# Patient Record
Sex: FEMALE
[De-identification: names, ages, dates, MRNs, and addresses within clinical notes are randomized per-mention and may not be internally consistent; named-entity substitution may affect disease eponyms.]

---

## 2018-05-16 ENCOUNTER — HOSPITAL ENCOUNTER (EMERGENCY)
Dept: HOSPITAL 68 - ERH | Age: 83
End: 2018-05-16
Payer: COMMERCIAL

## 2018-05-16 VITALS — BODY MASS INDEX: 27.48 KG/M2 | HEIGHT: 60 IN | WEIGHT: 140 LBS

## 2018-05-16 VITALS — SYSTOLIC BLOOD PRESSURE: 106 MMHG | DIASTOLIC BLOOD PRESSURE: 55 MMHG

## 2018-05-16 DIAGNOSIS — D69.6: Primary | ICD-10-CM

## 2018-05-16 DIAGNOSIS — F03.90: ICD-10-CM

## 2018-05-16 LAB
ABSOLUTE GRANULOCYTE CT: 3.9 /CUMM (ref 1.4–6.5)
APTT BLD: 30 SEC (ref 25–37)
BASOPHILS # BLD: 0 /CUMM (ref 0–0.2)
BASOPHILS NFR BLD: 0.6 % (ref 0–2)
EOSINOPHIL # BLD: 0.2 /CUMM (ref 0–0.7)
EOSINOPHIL NFR BLD: 4.1 % (ref 0–5)
ERYTHROCYTE [DISTWIDTH] IN BLOOD BY AUTOMATED COUNT: 16 % (ref 11.5–14.5)
GRANULOCYTES NFR BLD: 67.9 % (ref 42.2–75.2)
HCT VFR BLD CALC: 24.1 % (ref 37–47)
LYMPHOCYTES # BLD: 1 /CUMM (ref 1.2–3.4)
MCH RBC QN AUTO: 36.8 PG (ref 27–31)
MCHC RBC AUTO-ENTMCNC: 33.6 G/DL (ref 33–37)
MCV RBC AUTO: 109.4 FL (ref 81–99)
MONOCYTES # BLD: 0.5 /CUMM (ref 0.1–0.6)
PLATELET # BLD: 5 /CUMM (ref 130–400)
PMV BLD AUTO: 6.8 FL (ref 7.4–10.4)
PROTHROMBIN TIME: 12.7 SEC (ref 9.4–12.5)
RED BLOOD CELL CT: 2.2 /CUMM (ref 4.2–5.4)
WBC # BLD AUTO: 5.7 /CUMM (ref 4.8–10.8)

## 2018-05-16 NOTE — ED GENERAL ADULT
History of Present Illness
 
General
Chief Complaint: General Adult
Stated Complaint: BIBA ABNORMAL LABS
Source: old records
Exam Limitations: dementia
 
Vital Signs & Intake/Output
Vital Signs & Intake/Output
 Vital Signs
 
 
Date Time Temp Pulse Resp B/P B/P Pulse O2 O2 Flow FiO2
 
     Mean Ox Delivery Rate 
 
05/16 1846 96.2 66 18 106/55  98 Room Air  
 
05/16 1615 96.1 88 18 107/47  98 Room Air  
 
 
 
Triage Note:
BIBA FROM [facility] FOR LOW PLATELET COUNT ON
 RECENT BLOOD WORK. HERE FOR EVALUATION. AT
 BASELINE PT WITH DEMENTIA, NON VERBAL, ARMS STIFF
 AND CONTRACTED. PT EYES OPEN, IN NO APPARANT
 DISTRESS, MAKING UNRECOGNIZABLE NOISES, NO
 UNDERSTANDABLE WORDS. SKIN PALE WITH MULTIPLE
 ECCHYMOTIC AREAS
Triage Nurses Notes Reviewed? yes
Onset: Abrupt
Duration: constant
Timing: recent history
Severity: severe
Severity Numbers: 10
HPI:
Patient is a 86-year-old female with past medical history of dementia who 
presents emergency room brought in by ambulance for concerns of critical 
findings of a 7000 platelet count noted by patient's nursing staff at [facility].
 W-10 indicates that there is concerns of spontaneous bruising without a 
traumatic events.
(Danny Burrows)
 
Past History
 
Travel History
Traveled to [place] past 21 day No
 
Medical History
Any Pertinent Medical History? see below for history
Neurological: Alzheimer's disease, APHASIA
EENT: cataracts
Gastrointestinal: GERD
Musculoskeletal: rheumatoid arthritis
Blood Disorders: anemia
 
Surgical History
Surgical History: unobtainable
 
Psychosocial History
What is your primary language English
Tobacco Use: Cognitive Impairment
ETOH Use: denies use
Illicit Drug Use: denies illicit drug use
 
Family History
Hx Contributory? No
(Danny Burrows)
 
Review of Systems
 
Review of Systems
Constitutional:
Reports: no symptoms. 
EENTM:
Reports: no symptoms. 
Respiratory:
Reports: no symptoms. 
Cardiovascular:
Reports: no symptoms. 
GI:
Reports: no symptoms. 
Genitourinary:
Reports: no symptoms. 
Musculoskeletal:
Reports: no symptoms. 
Skin:
Reports: see HPI. 
Neurological/Psychological:
Reports: no symptoms. 
Hematologic/Endocrine:
Reports: no symptoms. 
Immunologic/Allergic:
Reports: no symptoms. 
All Other Systems: Reviewed and Negative
(Danny Burrows)
 
Physical Exam
 
Physical Exam
General Appearance: no apparent distress, comfortable
Head: atraumatic
Eyes:
Bilateral: normal appearance. 
Respiratory: quiet respiration
Cardiovascular: regular rate/rhythm
Gastrointestinal: normal bowel sounds, soft, non-tender
Rectal: heme negative stool
Extremities: no edema
Skin: intact, normal color
 
Core Measures
ACS in differential dx? No
CVA/TIA Diagnosis: No
Sepsis Present: No
Sepsis Focused Exam Completed? No
(Andriy CROCKETT,Danny)
 
Progress
Differential Diagnoses
I considered the following diagnoses in my evaluation of the patient: [
Thrombocytopenia cancer failure to thrive anemia
]
 
Plan of Care:
 Orders
 
 
Procedure Date/time Status
 
PARTIAL THROMBOPLASTIN TIME 05/16 1641 Complete
 
PROTHROMBIN TIME 05/16 1641 Complete
 
COMPREHENSIVE METABOLIC PANEL 05/16 1641 Complete
 
CBC WITHOUT DIFFERENTIAL 05/16 1641 Complete
 
 
 Laboratory Tests
 
 
 
05/16/18 1658:
Anion Gap 11, Estimated GFR 28  L, BUN/Creatinine Ratio 14.7, Glucose 87, 
Calcium 8.2  L, Total Bilirubin 0.4, AST 11  L, ALT 12, Alkaline Phosphatase 121
, Total Protein 6.5, Albumin 3.1  L, Globulin 3.4, Albumin/Globulin Ratio 0.9  L
, PT 12.7  H, INR 1.16, APTT 30, CBC w Diff NO MAN DIFF REQ, RBC 2.20  L, MCV 
109.4  H, MCH 36.8  H, MCHC 33.6, RDW 16.0  H, MPV 6.8  L, Gran % 67.9, 
Lymphocytes % 18.1  L, Monocytes % 9.3, Eosinophils % 4.1, Basophils % 0.6, 
Absolute Granulocytes 3.9, Absolute Lymphocytes 1.0  L, Absolute Monocytes 0.5, 
Absolute Eosinophils 0.2, Absolute Basophils 0
 on initial examination had a nontender abdomen and no acute findings of 
spontaneous bleeding
 
I had a long extensive conversation with the power of  gym the son in 
which patient's power of  was made aware of the critical findings of 
thrombocytopenia in which he states that patient is a DNR/DNI however he 
absolved temporarily the do not hospitalize patient status today due to the ECF 
making the power of  aware of the critical findings which prompted 
patient to be evaluated at the hospital.
 
I discussed with the power of  that admission can be afforded for 
possible platelet transfusion and further evaluation treatment and hematology 
evaluation and consultation which his major concern was patient receiving 
comfort measures and to have patient not in any distress or pain.
 
The power of  was made aware that a spontaneous bleed may occur due to 
the platelets being excessively low and admission was available for the patient
 
Again after a long discussion with the power of  he recommended that the
patient to be safely discharged back to the care facility and TO not receive 
admission for further treatment for the thrombocytopenia.  I discussed the risks
of this with him in which she was aware and advised patient to be discharged 
back to [facility]
 
DISCUSSED PT WITH DR JOSEPH
Initial ED EKG: none
(Danny Burrows)
 
Departure
 
Departure
Disposition: HOME OR SELF CARE
Condition: Guarded
Clinical Impression
Primary Impression: Thrombocytopenia
Referrals:
Samanta LEDESMA,Wagner PINA (PCP/Family)
 
Additional Instructions:
Return to emergency room if symptoms worsen
Departure Forms:
Customer Survey
General Discharge Information
(Danny Burrows)
 
PA/NP Co-Sign Statement
Statement:
ED Attending supervision documentation-
 
[X] I saw and evaluated the patient. I have also reviewed all the pertinent lab 
results and diagnostic results. I agree with the findings and the plan of care 
as documented in the PA's/NP's documentation. 
 
[] I have reviewed the ED Record and agree with the PA's/NP's documentation.
 
[] Additions or exceptions (if any) to the PAs/NP's note and plan are 
summarized below:
[]
 
(Hua MICHAELS,Pro SIMMONS)
 
Critical Care Note
 
Critical Care Note
Critical Care Time: non-applicable
(Danny Burrows)